# Patient Record
Sex: MALE | Race: WHITE | NOT HISPANIC OR LATINO | URBAN - METROPOLITAN AREA
[De-identification: names, ages, dates, MRNs, and addresses within clinical notes are randomized per-mention and may not be internally consistent; named-entity substitution may affect disease eponyms.]

---

## 2023-10-11 ENCOUNTER — OFFICE VISIT (OUTPATIENT)
Dept: OPHTHALMOLOGY | Facility: MEDICAL CENTER | Age: 63
End: 2023-10-11
Payer: COMMERCIAL

## 2023-10-11 DIAGNOSIS — H40.003 GLAUCOMA SUSPECT OF BOTH EYES: ICD-10-CM

## 2023-10-11 DIAGNOSIS — H49.20 6TH NERVE PALSY, UNSPECIFIED LATERALITY: ICD-10-CM

## 2023-10-11 PROCEDURE — 99204 OFFICE O/P NEW MOD 45 MIN: CPT | Mod: 25 | Performed by: OPHTHALMOLOGY

## 2023-10-11 PROCEDURE — 92250 FUNDUS PHOTOGRAPHY W/I&R: CPT | Performed by: OPHTHALMOLOGY

## 2023-10-11 RX ORDER — DILTIAZEM HYDROCHLORIDE EXTENDED-RELEASE TABLETS 180 MG/1
TABLET, EXTENDED RELEASE ORAL
COMMUNITY

## 2023-10-11 RX ORDER — LOSARTAN POTASSIUM 100 MG/1
100 TABLET ORAL DAILY
COMMUNITY

## 2023-10-11 RX ORDER — ATORVASTATIN CALCIUM 40 MG/1
40 TABLET, FILM COATED ORAL NIGHTLY
COMMUNITY

## 2023-10-11 ASSESSMENT — VISUAL ACUITY
OS_CC: 20/20-1
OD_CC: 20/20
OD_CC: J1+
OS_CC: J1+
CORRECTION_TYPE: GLASSES
METHOD: SNELLEN - LINEAR

## 2023-10-11 ASSESSMENT — REFRACTION_MANIFEST
OS_CYLINDER: +1.25
OS_AXIS: 178
OD_AXIS: 172
OD_CYLINDER: +0.25
OD_SPHERE: +0.75
METHOD_AUTOREFRACTION: 1
OS_SPHERE: +0.50

## 2023-10-11 ASSESSMENT — CONF VISUAL FIELD
OS_INFERIOR_TEMPORAL_RESTRICTION: 0
OD_INFERIOR_NASAL_RESTRICTION: 0
OS_NORMAL: 1
OS_INFERIOR_NASAL_RESTRICTION: 0
OS_SUPERIOR_TEMPORAL_RESTRICTION: 0
OD_INFERIOR_TEMPORAL_RESTRICTION: 0
OD_SUPERIOR_NASAL_RESTRICTION: 0
OD_NORMAL: 1
OS_SUPERIOR_NASAL_RESTRICTION: 0
OD_SUPERIOR_TEMPORAL_RESTRICTION: 0

## 2023-10-11 ASSESSMENT — REFRACTION_WEARINGRX
OS_CYLINDER: +0.50
OD_ADD: +2.50
OD_CYLINDER: +0.25
OD_AXIS: 004
OS_AXIS: 177
SPECS_TYPE: TRIFOCAL
OS_ADD: +2.50
OS_SPHERE: +0.25
OD_SPHERE: +0.75

## 2023-10-11 ASSESSMENT — CUP TO DISC RATIO
OD_RATIO: 0.1
OS_RATIO: 0.1

## 2023-10-11 ASSESSMENT — TONOMETRY
OS_IOP_MMHG: 19
OD_IOP_MMHG: 16

## 2023-10-11 ASSESSMENT — ENCOUNTER SYMPTOMS
DOUBLE VISION: 1
HEADACHES: 1

## 2023-10-11 ASSESSMENT — SLIT LAMP EXAM - LIDS
COMMENTS: NORMAL
COMMENTS: NORMAL

## 2023-10-11 ASSESSMENT — EXTERNAL EXAM - LEFT EYE: OS_EXAM: NORMAL

## 2023-10-11 ASSESSMENT — EXTERNAL EXAM - RIGHT EYE: OD_EXAM: NORMAL

## 2023-10-11 NOTE — PROGRESS NOTES
Peds/Neuro Ophthalmology:   Elliot Brewster M.D.    Date & Time note created:    10/11/2023   9:33 AM     Referring MD / APRN:  Pcp Not In Computer, No att. providers found    Patient ID:  Name:             Ryan Barros     YOB: 1960  Age:                 63 y.o.  male   MRN:               [id number]    Chief Complaint/Reason for Visit:     Diplopia      History of Present Illness:    Ryan Barros is a 63 y.o. male   Patient referred for double vision which started this spring and is almost completely resolved.Patient had another episode of double vision about 5 years ago lasting a few months then resolved.+ head injury in high school.Some low level headache everyday.        Review of Systems:  Review of Systems   Eyes:  Positive for double vision.   Neurological:  Positive for headaches.   All other systems reviewed and are negative.      Past Medical History:   Past Medical History:   Diagnosis Date    Diabetes (HCC)     Hyperlipidemia     Hypertension        Past Surgical History:  History reviewed. No pertinent surgical history.    Current Outpatient Medications:  Current Outpatient Medications   Medication Sig Dispense Refill    losartan (COZAAR) 100 MG Tab Take 100 mg by mouth every day.      dilTIAZem HCl  MG TABLET SR 24 HR Take  by mouth.      metFORMIN (GLUCOPHAGE) 500 MG Tab Take 500 mg by mouth 2 times a day with meals.      atorvastatin (LIPITOR) 40 MG Tab Take 40 mg by mouth every evening.       No current facility-administered medications for this visit.       Allergies:  No Known Allergies    Family History:  History reviewed. No pertinent family history.    Social History:  Social History     Socioeconomic History    Marital status: Single     Spouse name: Not on file    Number of children: Not on file    Years of education: Not on file    Highest education level: Not on file   Occupational History    Not on file   Tobacco Use    Smoking status: Every Day     Types:  Cigarettes    Smokeless tobacco: Never   Substance and Sexual Activity    Alcohol use: Not on file    Drug use: Not on file    Sexual activity: Not on file   Other Topics Concern    Not on file   Social History Narrative    Well setter     Social Determinants of Health     Financial Resource Strain: Not on file   Food Insecurity: Not on file   Transportation Needs: Not on file   Physical Activity: Not on file   Stress: Not on file   Social Connections: Not on file   Intimate Partner Violence: Not on file   Housing Stability: Not on file          Physical Exam:  Physical Exam    Oriented x 3  Weight/BMI: There is no height or weight on file to calculate BMI.  There were no vitals taken for this visit.    Base Eye Exam       Visual Acuity (Snellen - Linear)         Right Left    Dist cc 20/20 20/20-1    Near cc J1+ J1+      Correction: Glasses              Tonometry (i care, 8:01 AM)         Right Left    Pressure 16 19              Pupils         Pupils    Right PERRL    Left PERRL              Visual Fields         Right Left     Full Full              Extraocular Movement         Right Left     Full, Ortho Full, Ortho              Neuro/Psych       Oriented x3: Yes    Mood/Affect: Normal                  Additional Tests       Color         Right Left    Ishihara 9/9 9/9              Stereo       Fly: +    Animals: 3/3    Circles: 9/9                  Slit Lamp and Fundus Exam       External Exam         Right Left    External Normal Normal              Slit Lamp Exam         Right Left    Lids/Lashes Normal Normal    Conjunctiva/Sclera White and quiet White and quiet    Cornea Clear Clear    Anterior Chamber Deep and quiet Deep and quiet    Iris Round and reactive Round and reactive    Lens Nuclear sclerosis Nuclear sclerosis    Vitreous Normal Normal              Fundus Exam         Right Left    Disc Normal Normal    C/D Ratio 0.1 0.1    Macula Normal Normal    Vessels Normal Normal    Periphery Normal Normal                   Refraction       Wearing Rx         Sphere Cylinder Axis Add    Right +0.75 +0.25 004 +2.50    Left +0.25 +0.50 177 +2.50      Age: 1yr    Type: Trifocal              Manifest Refraction (Auto)         Sphere Cylinder Axis    Right +0.75 +0.25 172    Left +0.50 +1.25 178                    Pertinent Lab/Test/Imaging Review:      Assessment and Plan:     6th nerve palsy, unspecified laterality  10/11/2023-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  Previous episode back in 2015 that resolved.  This episode also has resolved.  According to the patient he had an MRI scan done at [facility] that was unremarkable although I do not do not have that specific record.  Thus discussed vasculopathic cranial neuropathies and gave him information to read at home.  Currently there is resolved so no further treatment is required other than better control of his hypertension and diabetes    Diabetes (HCC)  10/11/2023-no background diabetic retinopathy    Glaucoma suspect of both eyes  10/11/2023-IOP stable.  No significant cupping.  OCT neurofibrillary thickness 119  OS nerves tilted    45 minutes time spent.  This included review of prior notes from Dr. Mendieta, reviewed from [facility] eye [facility].    Elliot Brewster M.D.

## 2023-10-11 NOTE — ASSESSMENT & PLAN NOTE
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  Previous episode back in 2015 that resolved.  This episode also has resolved.  According to the patient he had an MRI scan done at [facility] that was unremarkable although I do not do not have that specific record.  Thus discussed vasculopathic cranial neuropathies and gave him information to read at home.  Currently there is resolved so no further treatment is required other than better control of his hypertension and diabetes

## 2023-10-11 NOTE — ASSESSMENT & PLAN NOTE
10/11/2023-IOP stable.  No significant cupping.  OCT neurofibrillary thickness 119  OS nerves tilted